# Patient Record
Sex: FEMALE | HISPANIC OR LATINO | URBAN - METROPOLITAN AREA
[De-identification: names, ages, dates, MRNs, and addresses within clinical notes are randomized per-mention and may not be internally consistent; named-entity substitution may affect disease eponyms.]

---

## 2022-02-09 ENCOUNTER — APPOINTMENT (RX ONLY)
Dept: URBAN - METROPOLITAN AREA CLINIC 91 | Facility: CLINIC | Age: 42
Setting detail: DERMATOLOGY
End: 2022-02-09

## 2022-02-09 DIAGNOSIS — L20.89 OTHER ATOPIC DERMATITIS: ICD-10-CM | Status: INADEQUATELY CONTROLLED

## 2022-02-09 DIAGNOSIS — L81.1 CHLOASMA: ICD-10-CM

## 2022-02-09 DIAGNOSIS — D18.0 HEMANGIOMA: ICD-10-CM

## 2022-02-09 PROBLEM — D18.01 HEMANGIOMA OF SKIN AND SUBCUTANEOUS TISSUE: Status: ACTIVE | Noted: 2022-02-09

## 2022-02-09 PROCEDURE — ? COUNSELING

## 2022-02-09 PROCEDURE — ? PRESCRIPTION

## 2022-02-09 PROCEDURE — ? FULL BODY SKIN EXAM - DECLINED

## 2022-02-09 PROCEDURE — 99204 OFFICE O/P NEW MOD 45 MIN: CPT

## 2022-02-09 RX ORDER — DESONIDE 0.5 MG/G
CREAM TOPICAL BID
Qty: 60 | Refills: 3 | Status: ERX | COMMUNITY
Start: 2022-02-09

## 2022-02-09 RX ORDER — HYDROQUINONE 4 %
CREAM (GRAM) TOPICAL
Qty: 30 | Refills: 3 | COMMUNITY
Start: 2022-02-09

## 2022-02-09 RX ORDER — TRETIONIN 0.5 MG/G
CREAM TOPICAL QHS
Qty: 45 | Refills: 3 | COMMUNITY
Start: 2022-02-09

## 2022-02-09 RX ADMIN — Medication: at 00:00

## 2022-02-09 RX ADMIN — DESONIDE: 0.5 CREAM TOPICAL at 00:00

## 2022-02-09 RX ADMIN — TRETIONIN: 0.5 CREAM TOPICAL at 00:00

## 2022-02-09 ASSESSMENT — LOCATION DETAILED DESCRIPTION DERM
LOCATION DETAILED: RIGHT LATERAL INFERIOR EYELID
LOCATION DETAILED: LEFT LATERAL SUPERIOR CHEST
LOCATION DETAILED: PERIUMBILICAL SKIN
LOCATION DETAILED: LEFT LATERAL INFERIOR EYELID
LOCATION DETAILED: LEFT INFERIOR CENTRAL MALAR CHEEK

## 2022-02-09 ASSESSMENT — LOCATION SIMPLE DESCRIPTION DERM
LOCATION SIMPLE: LEFT CHEEK
LOCATION SIMPLE: RIGHT INFERIOR EYELID
LOCATION SIMPLE: ABDOMEN
LOCATION SIMPLE: LEFT INFERIOR EYELID
LOCATION SIMPLE: CHEST

## 2022-02-09 ASSESSMENT — LOCATION ZONE DERM
LOCATION ZONE: EYELID
LOCATION ZONE: FACE
LOCATION ZONE: TRUNK

## 2022-06-17 ENCOUNTER — APPOINTMENT (RX ONLY)
Dept: URBAN - METROPOLITAN AREA CLINIC 91 | Facility: CLINIC | Age: 42
Setting detail: DERMATOLOGY
End: 2022-06-17

## 2022-06-17 DIAGNOSIS — L20.89 OTHER ATOPIC DERMATITIS: ICD-10-CM | Status: IMPROVED

## 2022-06-17 DIAGNOSIS — L81.1 CHLOASMA: ICD-10-CM | Status: IMPROVED

## 2022-06-17 PROBLEM — D23.5 OTHER BENIGN NEOPLASM OF SKIN OF TRUNK: Status: ACTIVE | Noted: 2022-06-17

## 2022-06-17 PROCEDURE — ? FULL BODY SKIN EXAM - DECLINED

## 2022-06-17 PROCEDURE — ? COUNSELING

## 2022-06-17 PROCEDURE — 99214 OFFICE O/P EST MOD 30 MIN: CPT

## 2022-06-17 PROCEDURE — ? PRESCRIPTION MEDICATION MANAGEMENT

## 2022-06-17 ASSESSMENT — LOCATION SIMPLE DESCRIPTION DERM
LOCATION SIMPLE: RIGHT INFERIOR EYELID
LOCATION SIMPLE: LEFT CHEEK
LOCATION SIMPLE: LEFT INFERIOR EYELID

## 2022-06-17 ASSESSMENT — LOCATION ZONE DERM
LOCATION ZONE: FACE
LOCATION ZONE: EYELID

## 2022-06-17 ASSESSMENT — LOCATION DETAILED DESCRIPTION DERM
LOCATION DETAILED: LEFT LATERAL INFERIOR EYELID
LOCATION DETAILED: LEFT INFERIOR CENTRAL MALAR CHEEK
LOCATION DETAILED: RIGHT LATERAL INFERIOR EYELID

## 2022-06-17 NOTE — PROCEDURE: PRESCRIPTION MEDICATION MANAGEMENT
Detail Level: Zone
Continue Regimen: hydroquinone 4 % topical cream prn\\ntretinoin 0.05 % topical cream Qhs
Render In Strict Bullet Format?: No
Continue Regimen: desonide 0.05 % topical cream BID PRN

## 2023-06-14 ENCOUNTER — APPOINTMENT (RX ONLY)
Dept: URBAN - METROPOLITAN AREA CLINIC 91 | Facility: CLINIC | Age: 43
Setting detail: DERMATOLOGY
End: 2023-06-14

## 2023-06-14 DIAGNOSIS — Z41.9 ENCOUNTER FOR PROCEDURE FOR PURPOSES OTHER THAN REMEDYING HEALTH STATE, UNSPECIFIED: ICD-10-CM

## 2023-06-14 PROCEDURE — ? ADDITIONAL NOTES

## 2023-06-14 PROCEDURE — ? COSMETIC CONSULTATION: BOTOX

## 2023-06-14 PROCEDURE — ? FULL BODY SKIN EXAM - DECLINED

## 2023-06-14 PROCEDURE — ? PHOTO-DOCUMENTATION

## 2023-06-14 ASSESSMENT — LOCATION DETAILED DESCRIPTION DERM
LOCATION DETAILED: LEFT INFERIOR TEMPLE
LOCATION DETAILED: RIGHT INFERIOR TEMPLE
LOCATION DETAILED: GLABELLA
LOCATION DETAILED: RIGHT INFERIOR MEDIAL FOREHEAD

## 2023-06-14 ASSESSMENT — LOCATION SIMPLE DESCRIPTION DERM
LOCATION SIMPLE: RIGHT FOREHEAD
LOCATION SIMPLE: GLABELLA
LOCATION SIMPLE: RIGHT TEMPLE
LOCATION SIMPLE: LEFT TEMPLE

## 2023-06-14 ASSESSMENT — LOCATION ZONE DERM: LOCATION ZONE: FACE

## 2023-06-14 NOTE — PROCEDURE: ADDITIONAL NOTES
Render Risk Assessment In Note?: no
Detail Level: Simple
Additional Notes: Quoted $420 for 30 units. Patient coming back this Friday for treatment

## 2023-06-16 ENCOUNTER — APPOINTMENT (RX ONLY)
Dept: URBAN - METROPOLITAN AREA CLINIC 93 | Facility: CLINIC | Age: 43
Setting detail: DERMATOLOGY
End: 2023-06-16

## 2023-06-16 DIAGNOSIS — Z41.9 ENCOUNTER FOR PROCEDURE FOR PURPOSES OTHER THAN REMEDYING HEALTH STATE, UNSPECIFIED: ICD-10-CM

## 2023-06-16 PROCEDURE — ? BOTOX (U OR CC)

## 2023-06-16 PROCEDURE — ? PHOTO-DOCUMENTATION

## 2023-10-05 ENCOUNTER — APPOINTMENT (RX ONLY)
Dept: URBAN - METROPOLITAN AREA CLINIC 93 | Facility: CLINIC | Age: 43
Setting detail: DERMATOLOGY
End: 2023-10-05

## 2023-10-05 DIAGNOSIS — Z41.9 ENCOUNTER FOR PROCEDURE FOR PURPOSES OTHER THAN REMEDYING HEALTH STATE, UNSPECIFIED: ICD-10-CM

## 2023-10-05 PROCEDURE — ? PHOTO-DOCUMENTATION

## 2023-10-05 PROCEDURE — ? BOTOX (U OR CC)

## 2023-10-18 ENCOUNTER — APPOINTMENT (RX ONLY)
Dept: URBAN - METROPOLITAN AREA CLINIC 91 | Facility: CLINIC | Age: 43
Setting detail: DERMATOLOGY
End: 2023-10-18

## 2023-10-18 DIAGNOSIS — Z41.9 ENCOUNTER FOR PROCEDURE FOR PURPOSES OTHER THAN REMEDYING HEALTH STATE, UNSPECIFIED: ICD-10-CM

## 2023-10-18 PROCEDURE — ? PHOTO-DOCUMENTATION

## 2023-10-18 PROCEDURE — ? COSMETIC FOLLOW-UP

## 2023-10-18 NOTE — PROCEDURE: COSMETIC FOLLOW-UP
Price (Use Numbers Only, No Special Characters Or $): 0
Global Improvement: Very Good
Patient Satisfaction: Very pleased
Treatment (Optional): Botox
Detail Level: Zone
Side Effects Or Complications: None

## 2024-02-07 ENCOUNTER — APPOINTMENT (RX ONLY)
Dept: URBAN - METROPOLITAN AREA CLINIC 91 | Facility: CLINIC | Age: 44
Setting detail: DERMATOLOGY
End: 2024-02-07

## 2024-02-07 DIAGNOSIS — Z41.9 ENCOUNTER FOR PROCEDURE FOR PURPOSES OTHER THAN REMEDYING HEALTH STATE, UNSPECIFIED: ICD-10-CM

## 2024-02-07 PROCEDURE — ? PHOTO-DOCUMENTATION

## 2024-02-07 PROCEDURE — ? BOTOX (U OR CC)

## 2024-02-07 NOTE — PROCEDURE: BOTOX (U OR CC)
Masseter Units: 0
Dilution (U/0.1 Cc): 10
Document As Units Or Cc?: units
Additional Area 1 Location: jelly roll
Post-Care Instructions: Patient instructed to not lie down for 4 hours and limit physical activity for 24 hours.
Price (Use Numbers Only, No Special Characters Or $): 510
Detail Level: Detailed
Lot #: [id number]
Inferior Lateral Orbicularis Oculi Units: 12
Including Pricing Information In The Note: No
Additional Area 1 Units: 2
Expiration Date (Month Year): 02/2026
Consent: Written consent obtained. Risks include but not limited to lid/brow ptosis, bruising, swelling, diplopia, temporary effect, incomplete chemical denervation.
Forehead Units/Cc: 8

## 2024-02-21 ENCOUNTER — APPOINTMENT (RX ONLY)
Dept: URBAN - METROPOLITAN AREA CLINIC 91 | Facility: CLINIC | Age: 44
Setting detail: DERMATOLOGY
End: 2024-02-21

## 2024-02-21 DIAGNOSIS — Z41.9 ENCOUNTER FOR PROCEDURE FOR PURPOSES OTHER THAN REMEDYING HEALTH STATE, UNSPECIFIED: ICD-10-CM

## 2024-02-21 PROCEDURE — ? COSMETIC FOLLOW-UP

## 2024-02-21 PROCEDURE — ? PHOTO-DOCUMENTATION

## 2024-02-21 NOTE — PROCEDURE: COSMETIC FOLLOW-UP
Detail Level: Zone
Treatment (Optional): Botox
Side Effects Or Complications: None
Patient Satisfaction: Very pleased
Price (Use Numbers Only, No Special Characters Or $): 0
Global Improvement: Very Good

## 2024-05-29 ENCOUNTER — APPOINTMENT (RX ONLY)
Dept: URBAN - METROPOLITAN AREA CLINIC 91 | Facility: CLINIC | Age: 44
Setting detail: DERMATOLOGY
End: 2024-05-29

## 2024-05-29 DIAGNOSIS — Z41.9 ENCOUNTER FOR PROCEDURE FOR PURPOSES OTHER THAN REMEDYING HEALTH STATE, UNSPECIFIED: ICD-10-CM

## 2024-05-29 PROCEDURE — ? BOTOX (U OR CC)

## 2024-05-29 PROCEDURE — ? PHOTO-DOCUMENTATION

## 2024-05-29 NOTE — PROCEDURE: BOTOX (U OR CC)
Masseter Units: 0
Dilution (U/0.1 Cc): 10
Document As Units Or Cc?: units
Additional Area 1 Location: jelly roll
Post-Care Instructions: Patient instructed to not lie down for 4 hours and limit physical activity for 24 hours.
Price (Use Numbers Only, No Special Characters Or $): 492
Detail Level: Detailed
Lot #: [id number]
Inferior Lateral Orbicularis Oculi Units: 12
Including Pricing Information In The Note: No
Additional Area 1 Units: 2
Expiration Date (Month Year): 03/2026
Consent: Written consent obtained. Risks include but not limited to lid/brow ptosis, bruising, swelling, diplopia, temporary effect, incomplete chemical denervation.
Forehead Units/Cc: 8

## 2024-06-04 RX ORDER — HYDROQUINONE 4 %
CREAM (GRAM) TOPICAL
Qty: 30 | Refills: 3 | Status: ERX

## 2024-09-11 ENCOUNTER — APPOINTMENT (RX ONLY)
Dept: URBAN - METROPOLITAN AREA CLINIC 91 | Facility: CLINIC | Age: 44
Setting detail: DERMATOLOGY
End: 2024-09-11

## 2024-09-11 DIAGNOSIS — Z41.9 ENCOUNTER FOR PROCEDURE FOR PURPOSES OTHER THAN REMEDYING HEALTH STATE, UNSPECIFIED: ICD-10-CM

## 2024-09-11 PROCEDURE — ? BOTOX (U OR CC)

## 2024-09-11 PROCEDURE — ? PHOTO-DOCUMENTATION

## 2024-09-11 NOTE — PROCEDURE: BOTOX (U OR CC)
Masseter Units: 0
Dilution (U/0.1 Cc): 10
Document As Units Or Cc?: units
Additional Area 1 Location: jelly roll
Post-Care Instructions: Patient instructed to not lie down for 4 hours and limit physical activity for 24 hours.
Price (Use Numbers Only, No Special Characters Or $): 914
Detail Level: Detailed
Lot #: [id number]
Inferior Lateral Orbicularis Oculi Units: 12
Including Pricing Information In The Note: No
Additional Area 1 Units: 2
Expiration Date (Month Year): 04/2026
Consent: Written consent obtained. Risks include but not limited to lid/brow ptosis, bruising, swelling, diplopia, temporary effect, incomplete chemical denervation.
Forehead Units/Cc: 8

## 2024-12-17 ENCOUNTER — APPOINTMENT (OUTPATIENT)
Dept: URBAN - METROPOLITAN AREA CLINIC 91 | Facility: CLINIC | Age: 44
Setting detail: DERMATOLOGY
End: 2024-12-17

## 2024-12-17 DIAGNOSIS — Z41.9 ENCOUNTER FOR PROCEDURE FOR PURPOSES OTHER THAN REMEDYING HEALTH STATE, UNSPECIFIED: ICD-10-CM | Status: WELL CONTROLLED

## 2024-12-17 PROCEDURE — ? DYSPORT (U OR CC)

## 2024-12-17 PROCEDURE — ? PHOTO-DOCUMENTATION

## 2024-12-17 ASSESSMENT — LOCATION DETAILED DESCRIPTION DERM
LOCATION DETAILED: GLABELLA
LOCATION DETAILED: LEFT INFERIOR TEMPLE
LOCATION DETAILED: RIGHT LATERAL CANTHUS
LOCATION DETAILED: LEFT MEDIAL FOREHEAD

## 2024-12-17 ASSESSMENT — LOCATION SIMPLE DESCRIPTION DERM
LOCATION SIMPLE: LEFT FOREHEAD
LOCATION SIMPLE: GLABELLA
LOCATION SIMPLE: RIGHT EYELID
LOCATION SIMPLE: LEFT TEMPLE

## 2024-12-17 ASSESSMENT — LOCATION ZONE DERM
LOCATION ZONE: FACE
LOCATION ZONE: EYELID

## 2024-12-17 NOTE — PROCEDURE: DYSPORT (U OR CC)
Price Per Unit Or Per Cc In $ (Use Numbers Only, No Special Characters Or $): 13
Reconstitution Date (Optional): 12/02/24
Detail Level: Detailed
Additional Comments: 30 x 3 Dysport units = 90
Post-Care Instructions: Patient instructed to not lie down for 4 hours and limit physical activity for 24 hours. Patient instructed not to travel by airplane for 48 hours.
Dilution (U/0.1 Cc): 1.5
Expiration Date (Month Year): 05/31/2026
Measure In Units Or Cc's?: units
Consent: Written consent obtained.  Risks include but not limited to lid/brow ptosis, bruising, swelling, diplopia, temporary effect, incomplete chemical denervation.
Quantity Per Injection Site (Units): 6
Quantity Per Injection Site (Units): 8
Quantity Per Injection Site (Units): 10

## 2025-03-11 ENCOUNTER — APPOINTMENT (OUTPATIENT)
Dept: URBAN - METROPOLITAN AREA CLINIC 91 | Facility: CLINIC | Age: 45
Setting detail: DERMATOLOGY
End: 2025-03-11

## 2025-03-11 DIAGNOSIS — Z41.9 ENCOUNTER FOR PROCEDURE FOR PURPOSES OTHER THAN REMEDYING HEALTH STATE, UNSPECIFIED: ICD-10-CM

## 2025-03-11 PROCEDURE — ? PHOTO-DOCUMENTATION

## 2025-03-11 PROCEDURE — ? DYSPORT (U OR CC)

## 2025-03-11 ASSESSMENT — LOCATION DETAILED DESCRIPTION DERM
LOCATION DETAILED: LEFT INFERIOR TEMPLE
LOCATION DETAILED: RIGHT LATERAL CANTHUS
LOCATION DETAILED: GLABELLA
LOCATION DETAILED: LEFT MEDIAL FOREHEAD

## 2025-03-11 ASSESSMENT — LOCATION SIMPLE DESCRIPTION DERM
LOCATION SIMPLE: RIGHT EYELID
LOCATION SIMPLE: LEFT TEMPLE
LOCATION SIMPLE: GLABELLA
LOCATION SIMPLE: LEFT FOREHEAD

## 2025-03-11 ASSESSMENT — LOCATION ZONE DERM
LOCATION ZONE: FACE
LOCATION ZONE: EYELID

## 2025-03-11 NOTE — PROCEDURE: DYSPORT (U OR CC)
Price Per Unit Or Per Cc In $ (Use Numbers Only, No Special Characters Or $): 13
Reconstitution Date (Optional): 12/02/24
Detail Level: Detailed
Additional Comments: 30 x 3 Dysport units = 90
Post-Care Instructions: Patient instructed to not lie down for 4 hours and limit physical activity for 24 hours. Patient instructed not to travel by airplane for 48 hours.
Dilution (U/0.1 Cc): 1.5
Expiration Date (Month Year): 6/30/26
Measure In Units Or Cc's?: units
Consent: Written consent obtained.  Risks include but not limited to lid/brow ptosis, bruising, swelling, diplopia, temporary effect, incomplete chemical denervation.
Quantity Per Injection Site (Units): 6
Quantity Per Injection Site (Units): 8
Quantity Per Injection Site (Units): 10

## 2025-06-03 ENCOUNTER — APPOINTMENT (OUTPATIENT)
Dept: URBAN - METROPOLITAN AREA CLINIC 91 | Facility: CLINIC | Age: 45
Setting detail: DERMATOLOGY
End: 2025-06-03

## 2025-06-03 DIAGNOSIS — Z41.9 ENCOUNTER FOR PROCEDURE FOR PURPOSES OTHER THAN REMEDYING HEALTH STATE, UNSPECIFIED: ICD-10-CM

## 2025-06-03 PROCEDURE — ? PHOTO-DOCUMENTATION

## 2025-06-03 PROCEDURE — ? DYSPORT (U OR CC)

## 2025-06-03 ASSESSMENT — LOCATION SIMPLE DESCRIPTION DERM
LOCATION SIMPLE: RIGHT EYELID
LOCATION SIMPLE: LEFT FOREHEAD
LOCATION SIMPLE: GLABELLA
LOCATION SIMPLE: LEFT TEMPLE

## 2025-06-03 ASSESSMENT — LOCATION ZONE DERM
LOCATION ZONE: FACE
LOCATION ZONE: EYELID

## 2025-06-03 ASSESSMENT — LOCATION DETAILED DESCRIPTION DERM
LOCATION DETAILED: LEFT MEDIAL FOREHEAD
LOCATION DETAILED: GLABELLA
LOCATION DETAILED: LEFT INFERIOR TEMPLE
LOCATION DETAILED: RIGHT LATERAL CANTHUS

## 2025-06-03 NOTE — PROCEDURE: DYSPORT (U OR CC)
Price Per Unit Or Per Cc In $ (Use Numbers Only, No Special Characters Or $): 13
Reconstitution Date (Optional): 12/02/24
Detail Level: Detailed
Additional Comments: 30 x 2 Dysport units = 60
Post-Care Instructions: Patient instructed to not lie down for 4 hours and limit physical activity for 24 hours. Patient instructed not to travel by airplane for 48 hours.
Dilution (U/0.1 Cc): 1.5
Expiration Date (Month Year): 6/30/26
Measure In Units Or Cc's?: units
Consent: Written consent obtained.  Risks include but not limited to lid/brow ptosis, bruising, swelling, diplopia, temporary effect, incomplete chemical denervation.
Quantity Per Injection Site (Units): 6
Quantity Per Injection Site (Units): 8
Quantity Per Injection Site (Units): 10